# Patient Record
Sex: MALE | Race: WHITE
[De-identification: names, ages, dates, MRNs, and addresses within clinical notes are randomized per-mention and may not be internally consistent; named-entity substitution may affect disease eponyms.]

---

## 2021-05-10 ENCOUNTER — HOSPITAL ENCOUNTER (EMERGENCY)
Dept: HOSPITAL 26 - MED | Age: 31
Discharge: HOME | End: 2021-05-10
Payer: SELF-PAY

## 2021-05-10 VITALS — WEIGHT: 138 LBS | BODY MASS INDEX: 19.32 KG/M2 | HEIGHT: 71 IN

## 2021-05-10 VITALS — SYSTOLIC BLOOD PRESSURE: 138 MMHG | DIASTOLIC BLOOD PRESSURE: 83 MMHG

## 2021-05-10 VITALS — DIASTOLIC BLOOD PRESSURE: 83 MMHG | SYSTOLIC BLOOD PRESSURE: 138 MMHG

## 2021-05-10 DIAGNOSIS — L60.0: Primary | ICD-10-CM

## 2021-05-10 RX ADMIN — BACITRACIN ZINC STA UNITS: 500 OINTMENT TOPICAL at 09:26

## 2021-05-10 NOTE — NUR
Patient discharged with v/s stable. Written and verbal after care instructions 
given and explained. 

Patient alert, oriented and verbalized understanding of instructions. 
Ambulatory with steady gait. All questions addressed prior to discharge. ID 
band removed. Patient advised to follow up with PMD. Rx of CEPHALEXIN, 
IBUPROFEN given. Patient educated on indication of medication including 
possible reaction and side effects. Opportunity to ask questions provided and 
answered.

## 2021-05-10 NOTE — NUR
31 Y/O M BIB SELF FROM HOME, PATIENT PRESENTS TO ED WITH L FOOT 1ST DIGIT PAIN 
FOR "FEW WEEKS NOW". PT STATES HE HAD A PIECE OF WOOD FALL ON HIS FOOT AND PAIN 
GOT PROGRESSIVELY WORSE. AREA IS RED, SWELLING WITH YELLOW DRAINAGE FROM SITE. 
TOE NAIL IS GROWING TO SIDE OF TOE WITH THICKENING. DENIES N/V/D; SKIN IS 
PINK/WARM/DRY; AAOX4 WITH EVEN AND STEADY GAIT; LUNGS CLEAR BL; HR EVEN AND 
REGULAR; PT DENIES ANY FEVER, CP, SOB, OR COUGH AT THIS TIME; PATIENT STATES 
PAIN OF 9/10 AT THIS TIME; VSS; PATIENT POSITIONED FOR COMFORT; HOB ELEVATED; 
BEDRAILS UP X2; BED DOWN. ER MD MADE AWARE OF PT STATUS.



PMH: NONE

NKA

MED: NONE

## 2021-05-13 ENCOUNTER — HOSPITAL ENCOUNTER (EMERGENCY)
Dept: HOSPITAL 26 - MED | Age: 31
Discharge: HOME | End: 2021-05-13
Payer: SELF-PAY

## 2021-05-13 VITALS — SYSTOLIC BLOOD PRESSURE: 126 MMHG | DIASTOLIC BLOOD PRESSURE: 75 MMHG

## 2021-05-13 VITALS — HEIGHT: 71 IN | BODY MASS INDEX: 20.3 KG/M2 | WEIGHT: 145 LBS

## 2021-05-13 DIAGNOSIS — Z48.00: ICD-10-CM

## 2021-05-13 DIAGNOSIS — L60.0: Primary | ICD-10-CM

## 2021-05-13 NOTE — NUR
31 y/o M brought in from home with c/c follow-up. Patient reports seen for left 
ingrown toe nail to big toe. Denies any pain, swelling, redness, drainage. No 
abnormalities noted. Patient states no pain at this time. 



PMH/Sx/Meds: Denies

NKA